# Patient Record
Sex: MALE | Race: BLACK OR AFRICAN AMERICAN | NOT HISPANIC OR LATINO | URBAN - METROPOLITAN AREA
[De-identification: names, ages, dates, MRNs, and addresses within clinical notes are randomized per-mention and may not be internally consistent; named-entity substitution may affect disease eponyms.]

---

## 2020-01-01 ENCOUNTER — EMERGENCY (EMERGENCY)
Facility: HOSPITAL | Age: 30
LOS: 1 days | Discharge: HOME | End: 2020-01-01
Attending: EMERGENCY MEDICINE | Admitting: EMERGENCY MEDICINE
Payer: COMMERCIAL

## 2020-01-01 VITALS — HEIGHT: 75 IN | WEIGHT: 195.11 LBS

## 2020-01-01 DIAGNOSIS — Z20.828 CONTACT WITH AND (SUSPECTED) EXPOSURE TO OTHER VIRAL COMMUNICABLE DISEASES: ICD-10-CM

## 2020-01-01 DIAGNOSIS — I46.9 CARDIAC ARREST, CAUSE UNSPECIFIED: ICD-10-CM

## 2020-01-01 DIAGNOSIS — T75.1XXA UNSPECIFIED EFFECTS OF DROWNING AND NONFATAL SUBMERSION, INITIAL ENCOUNTER: ICD-10-CM

## 2020-01-01 PROCEDURE — 99285 EMERGENCY DEPT VISIT HI MDM: CPT | Mod: 25

## 2020-01-01 PROCEDURE — 92950 HEART/LUNG RESUSCITATION CPR: CPT
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
## 2020-06-11 NOTE — ED PROVIDER NOTE - PHYSICAL EXAMINATION
Physical Exam  GEN: cardiac arrest, sp intubation  EYES: unresponsive pupils, hazy  ENT: External inspection normal,   HEAD: no laceration  NECK: in c-collar  CV: asystole  RESP: sp intubation  GI: nondistended  MSK: no obvious deformities to extremities  SKIN: no obvious lacerations  NEURO: cardiac arrest,
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
## 2020-06-11 NOTE — ED ADULT NURSE NOTE - NSIMPLEMENTINTERV_GEN_ALL_ED
Implemented All Universal Safety Interventions:  [place] to call system. Call bell, personal items and telephone within reach. Instruct patient to call for assistance. Room bathroom lighting operational. Non-slip footwear when patient is off stretcher. Physically safe environment: no spills, clutter or unnecessary equipment. Stretcher in lowest position, wheels locked, appropriate side rails in place.
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
## 2020-06-12 LAB — SARS-COV-2 RNA SPEC QL NAA+PROBE: SIGNIFICANT CHANGE UP
